# Patient Record
Sex: MALE | Race: OTHER | HISPANIC OR LATINO | Employment: UNEMPLOYED | URBAN - METROPOLITAN AREA
[De-identification: names, ages, dates, MRNs, and addresses within clinical notes are randomized per-mention and may not be internally consistent; named-entity substitution may affect disease eponyms.]

---

## 2022-07-17 ENCOUNTER — HOSPITAL ENCOUNTER (EMERGENCY)
Facility: HOSPITAL | Age: 37
Discharge: HOME | End: 2022-07-17
Attending: EMERGENCY MEDICINE
Payer: MEDICAID

## 2022-07-17 VITALS
HEIGHT: 70 IN | WEIGHT: 209 LBS | RESPIRATION RATE: 18 BRPM | DIASTOLIC BLOOD PRESSURE: 103 MMHG | SYSTOLIC BLOOD PRESSURE: 171 MMHG | HEART RATE: 92 BPM | BODY MASS INDEX: 29.92 KG/M2 | OXYGEN SATURATION: 98 % | TEMPERATURE: 99 F

## 2022-07-17 DIAGNOSIS — K13.0 RASH ON LIPS: Primary | ICD-10-CM

## 2022-07-17 DIAGNOSIS — K13.0 LIP PAIN: ICD-10-CM

## 2022-07-17 PROCEDURE — 99284 EMERGENCY DEPT VISIT MOD MDM: CPT | Mod: ,,, | Performed by: PHYSICIAN ASSISTANT

## 2022-07-17 PROCEDURE — 99284 PR EMERGENCY DEPT VISIT,LEVEL IV: ICD-10-PCS | Mod: ,,, | Performed by: PHYSICIAN ASSISTANT

## 2022-07-17 PROCEDURE — 99283 EMERGENCY DEPT VISIT LOW MDM: CPT

## 2022-07-17 RX ORDER — MUPIROCIN 20 MG/G
OINTMENT TOPICAL 3 TIMES DAILY
Qty: 1 G | Refills: 0 | Status: SHIPPED | OUTPATIENT
Start: 2022-07-17 | End: 2022-07-24

## 2022-07-17 RX ORDER — CLINDAMYCIN HYDROCHLORIDE 300 MG/1
300 CAPSULE ORAL EVERY 6 HOURS
COMMUNITY

## 2022-07-17 NOTE — ED NOTES
Patient states he cut bottom lip 1 month ago, rx Bactrim 7/12, returned, on Clinda 7/16, also placed on Prednisone dose pack yesterday

## 2022-07-17 NOTE — ED PROVIDER NOTES
Encounter Date: 7/17/2022       History     Chief Complaint   Patient presents with    Facial Pain     ? Spreading, seen at  recently on antibiotics, then on clindamycin     The history is provided by the patient and medical records. No  was used.      Jr Evans is a 37 y.o. male with no reported medical history presenting to the ED with the chief complaint of facial pain.     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 Reports having gradual worsening lip pain and swelling. He was seen at  and given RX for Bactrim which he completed without improvement. Rash began to involve his lower lip. He returned to  yesterday and received a RX for Clindamycin and Medrol dose pack. Reports continued pain and starting to feel swelling to his left cheek. Additionally reports using numerous lip palms. Denies gingival or dental pain. No oral swelling or dysphagia. No fever, nasal pain, eye pain, neck pain, chest pain, SOB, cough, abdominal pain, vomiting, urinary or bowel movement changes.    Review of patient's allergies indicates:   Allergen Reactions    Penicillins Other (See Comments)    Latex, natural rubber Rash     History reviewed. No pertinent past medical history.  History reviewed. No pertinent surgical history.  History reviewed. No pertinent family history.  Social History     Tobacco Use    Smoking status: Never Smoker    Smokeless tobacco: Never Used   Substance Use Topics    Alcohol use: Yes     Comment: Daily, last use last night    Drug use: Yes     Types: Marijuana     Review of Systems   Constitutional: Negative for fever.   HENT: Negative for sore throat.    Eyes: Negative for pain.   Respiratory: Negative for shortness of breath.    Cardiovascular: Negative for chest pain.   Gastrointestinal: Negative for nausea.   Genitourinary: Negative for dysuria.   Musculoskeletal: Negative for back pain.    Skin: Positive for rash.   Neurological: Negative for weakness.   Hematological: Does not bruise/bleed easily.     Physical Exam     Initial Vitals [07/17/22 1527]   BP Pulse Resp Temp SpO2   (!) 171/103 92 18 98.6 °F (37 °C) 98 %      MAP       --         Physical Exam    Constitutional: He appears well-developed and well-nourished. He is not diaphoretic. No distress.   HENT:   Head: Normocephalic and atraumatic.   Mouth/Throat: Oropharynx is clear and moist.   Dry, cracked upper and lower lips with yellow crusting. Slight fullness to upper lip. No induration or expressible discharge. Tolerating secretions.   Eyes: EOM are normal. Pupils are equal, round, and reactive to light.   Neck:   Normal range of motion.  Cardiovascular: Normal rate and regular rhythm.   Pulmonary/Chest: No respiratory distress.   Speaking full sentences without difficulty. No accessory muscle use.   Musculoskeletal:         General: Normal range of motion.      Cervical back: Normal range of motion.     Neurological: He is alert and oriented to person, place, and time.   Skin: Skin is warm and dry.     Lips:      ED Course   Procedures  Labs Reviewed - No data to display       Imaging Results    None          Medications - No data to display  Medical Decision Making:   History:   Old Medical Records: I decided to obtain old medical records.  Old Records Summarized: records from clinic visits.       APC / Resident Notes:   37 y.o. male with no reported medical history presenting to the ED c/o persistent rash to his lip beginning 1-2 weeks ago.     Etiology most consistent with impetigo. No induration or fluctuance concerning for abscess. Lower suspicion for HSV, Herpes Zoster, SJS/TENS. Do not feel Medrol dose pack beneficial and advised him to discontinue. He has been on 1 day of Clindamycin which I advised him to continue. Additionally provided RX for Mupirocin ointment. Advised using non-scented lip balm. Ambulatory referral for  dermatology placed. Patient expresses understanding and agreeable to the plan. Return to ED precautions given for new, worsening, or concerning symptoms. I have discussed the care of this patient with my supervising physician.                    Clinical Impression:   Final diagnoses:  [K13.0] Lip pain  [K13.0] Rash on lips (Primary)          ED Disposition Condition    Discharge Stable        ED Prescriptions     Medication Sig Dispense Start Date End Date Auth. Provider    mupirocin (BACTROBAN) 2 % ointment Apply topically 3 (three) times daily. for 7 days 1 g 7/17/2022 7/24/2022 Ignacio Vallecillo PA-C        Follow-up Information     Follow up With Specialties Details Why Contact Info Additional Information    [facility] [address] [place] [address] Dermatology   [address]  [facility] [address] [phone] Dermatology - Main Building, Clinic 11th Floor Please park in [facility]. Use Clinic elevators 12 & 13 to get to the 11th floor           Ignacio Vallecillo PA-C  07/17/22 2041

## 2022-07-17 NOTE — FIRST PROVIDER EVALUATION
"Medical screening exam completed.  I have conducted a focused provider triage encounter, findings are as follows:    Brief history of present illness:  Facial swelling.  Had an injury from a microphone, but now his face is swelling. Prescribed bactrim and clinda from urgent care - told to come to ED if swelling worse.     Vitals:    07/17/22 1527   BP: (!) 171/103   Pulse: 92   Resp: 18   Temp: 98.6 °F (37 °C)   TempSrc: Oral   SpO2: 98%   Weight: 94.8 kg (209 lb)   Height: 5' 10" (1.778 m)       Pertinent physical exam:  No respiratory distress, speaking in full sentences, no hoarse voice    Work-up will be implemented by the physician or advanced practice provider that is assigned to the patient when roomed.  "

## 2022-07-17 NOTE — ED NOTES
Patient identifiers verified and correct for Mr Evans  C/C: Facial swelling SEE NN  APPEARANCE: awake and alert in NAD.  SKIN: warm, dry and intact. No breakdown or bruising.Minredness or swelling to lip  MUSCULOSKELETAL: Patient moving all extremities spontaneously, no obvious swelling or deformities noted. Ambulates independently.  RESPIRATORY: Denies shortness of breath.Respirations unlabored. Denies fevers  CARDIAC: Denies CP, 2+ distal pulses; no peripheral edema  ABDOMEN: S/ND/NT, Denies nausea  : voids spontaneously, denies difficulty  Neurologic: AAO x 4; follows commands equal strength in all extremities; denies numbness/tingling. Denies dizziness Denies wekaness

## 2022-07-17 NOTE — DISCHARGE INSTRUCTIONS
Begin using the prescribed Mupirocin ointment as directed.   Continue using Clindamycin as previously directed.   Discontinue using Medrol dose pack  Use over-the-counter non-scented lip moisturizer such as Cetaphil to help your lip dryness.  Follow-up with Dermatology for further evaluation.